# Patient Record
Sex: MALE | Employment: STUDENT | URBAN - METROPOLITAN AREA
[De-identification: names, ages, dates, MRNs, and addresses within clinical notes are randomized per-mention and may not be internally consistent; named-entity substitution may affect disease eponyms.]

---
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
## 2024-10-06 ENCOUNTER — OFFICE VISIT (OUTPATIENT)
Dept: ORTHOPEDICS | Facility: CLINIC | Age: 19
End: 2024-10-06
Payer: COMMERCIAL

## 2024-10-06 ENCOUNTER — ANCILLARY PROCEDURE (OUTPATIENT)
Dept: GENERAL RADIOLOGY | Facility: CLINIC | Age: 19
End: 2024-10-06
Attending: ORTHOPAEDIC SURGERY
Payer: COMMERCIAL

## 2024-10-06 DIAGNOSIS — M25.529 ELBOW PAIN: Primary | ICD-10-CM

## 2024-10-06 DIAGNOSIS — M25.529 ELBOW PAIN: ICD-10-CM

## 2024-10-06 DIAGNOSIS — S59.902A ELBOW INJURY, LEFT, INITIAL ENCOUNTER: Primary | ICD-10-CM

## 2024-10-06 PROCEDURE — 73080 X-RAY EXAM OF ELBOW: CPT | Mod: LT | Performed by: RADIOLOGY

## 2024-10-08 RX ORDER — DICLOFENAC SODIUM 75 MG/1
75 TABLET, DELAYED RELEASE ORAL 2 TIMES DAILY
Qty: 10 TABLET | Refills: 0 | Status: SHIPPED | OUTPATIENT
Start: 2024-10-08

## 2024-10-09 NOTE — PROGRESS NOTES
We had a chance to see the athlete today for follow-up.  He sustained an injury in the game yesterday when he stressed his left elbow.  Was a hyperextension injury.  He was able to return to the game.    We elected to get x-rays this morning and he returns to clinic to follow-up.    Examination today does show some swelling about his elbow.  Seems to be a little bit more in the lateral side to the medial side.  UCL is really nontender.  1+ effusion.  Full pronation supination full flexion extension.    Plain radiographs today show no fractures dislocations.  Concentric reduction.    Clinical assessment: Left hyperextension elbow injury    Plan: Brace tape splint he is cleared to play when able.  Will give him oral diclofenac.